# Patient Record
Sex: MALE | Race: BLACK OR AFRICAN AMERICAN | NOT HISPANIC OR LATINO | Employment: FULL TIME | URBAN - METROPOLITAN AREA
[De-identification: names, ages, dates, MRNs, and addresses within clinical notes are randomized per-mention and may not be internally consistent; named-entity substitution may affect disease eponyms.]

---

## 2017-01-10 ENCOUNTER — OFFICE VISIT (OUTPATIENT)
Dept: INTERNAL MEDICINE | Facility: CLINIC | Age: 27
End: 2017-01-10
Payer: COMMERCIAL

## 2017-01-10 ENCOUNTER — LAB VISIT (OUTPATIENT)
Dept: LAB | Facility: HOSPITAL | Age: 27
End: 2017-01-10
Attending: FAMILY MEDICINE
Payer: COMMERCIAL

## 2017-01-10 VITALS
HEIGHT: 70 IN | RESPIRATION RATE: 16 BRPM | OXYGEN SATURATION: 99 % | HEART RATE: 116 BPM | BODY MASS INDEX: 36.96 KG/M2 | SYSTOLIC BLOOD PRESSURE: 145 MMHG | DIASTOLIC BLOOD PRESSURE: 98 MMHG | WEIGHT: 258.19 LBS | TEMPERATURE: 98 F

## 2017-01-10 DIAGNOSIS — Z11.3 SCREEN FOR STD (SEXUALLY TRANSMITTED DISEASE): ICD-10-CM

## 2017-01-10 DIAGNOSIS — I10 ESSENTIAL HYPERTENSION: Primary | ICD-10-CM

## 2017-01-10 DIAGNOSIS — Z20.2 EXPOSURE TO TRICHOMONAS: ICD-10-CM

## 2017-01-10 PROCEDURE — 36415 COLL VENOUS BLD VENIPUNCTURE: CPT | Mod: PO

## 2017-01-10 PROCEDURE — 87591 N.GONORRHOEAE DNA AMP PROB: CPT

## 2017-01-10 PROCEDURE — 86592 SYPHILIS TEST NON-TREP QUAL: CPT

## 2017-01-10 PROCEDURE — 1159F MED LIST DOCD IN RCRD: CPT | Mod: S$GLB,,, | Performed by: FAMILY MEDICINE

## 2017-01-10 PROCEDURE — 99999 PR PBB SHADOW E&M-EST. PATIENT-LVL III: CPT | Mod: PBBFAC,,, | Performed by: FAMILY MEDICINE

## 2017-01-10 PROCEDURE — 86703 HIV-1/HIV-2 1 RESULT ANTBDY: CPT

## 2017-01-10 PROCEDURE — 99214 OFFICE O/P EST MOD 30 MIN: CPT | Mod: S$GLB,,, | Performed by: FAMILY MEDICINE

## 2017-01-10 PROCEDURE — 3080F DIAST BP >= 90 MM HG: CPT | Mod: S$GLB,,, | Performed by: FAMILY MEDICINE

## 2017-01-10 PROCEDURE — 3077F SYST BP >= 140 MM HG: CPT | Mod: S$GLB,,, | Performed by: FAMILY MEDICINE

## 2017-01-10 RX ORDER — METRONIDAZOLE 500 MG/1
2000 TABLET ORAL ONCE
Qty: 4 TABLET | Refills: 0 | Status: SHIPPED | OUTPATIENT
Start: 2017-01-10 | End: 2017-01-10

## 2017-01-10 NOTE — MR AVS SNAPSHOT
[facility] Internal Medicine  [address] [address] [address]  Phone: [phone]                  Yong Borrero Obear   1/10/2017 3:20 PM   Office Visit    Description:  Male : 1990   Provider:  Jimmy Saxena DO   Department:  [facility] Internal Medicine           Reason for Visit     Male  Problem           Diagnoses this Visit        Comments    Essential hypertension    -  Primary     Screen for STD (sexually transmitted disease)         Exposure to trichomonas                To Do List           Future Appointments        Provider Department Dept Phone    1/10/2017 3:50 PM [facility] LABORATORY [facility] [phone]    7/10/2017 1:40 PM Jimmy Saxena DO [facility] [phone]      Goals (5 Years of Data)     None      Follow-Up and Disposition     Return in about 6 months (around 7/10/2017), or if symptoms worsen or fail to improve.       These Medications        Disp Refills Start End    metronidazole (FLAGYL) 500 MG tablet 4 tablet 0 1/10/2017 1/10/2017    Take 4 tablets (2,000 mg total) by mouth once. - Oral    Pharmacy: [facility]/pharmacy #[id number] - [address] #: [phone]         [facility]     [facility] Nurse Care Line -  Assistance  Registered nurses in the [facility] provide clinical advisement, health education, appointment booking, and other advisory services.  Call for this free service at [phone].             Medications           Message regarding Medications     Verify the changes and/or additions to your medication regime listed below are the same as discussed with your clinician today.  If any of these changes or additions are incorrect, please notify your healthcare provider.        START taking these NEW medications        Refills    metronidazole (FLAGYL) 500 MG tablet 0    Sig: Take 4 tablets (2,000 mg total) by mouth once.    Class: Normal    Route: Oral     "       Verify that the below list of medications is an accurate representation of the medications you are currently taking.  If none reported, the list may be blank. If incorrect, please contact your healthcare provider. Carry this list with you in case of emergency.           Current Medications     amlodipine (NORVASC) 10 MG tablet Take 1 tablet (10 mg total) by mouth once daily.    aspirin-acetaminophen-caffeine 250-250-65 mg (EXCEDRIN MIGRAINE) 250-250-65 mg per tablet Take 1 tablet by mouth every 6 (six) hours as needed for Pain.    metronidazole (FLAGYL) 500 MG tablet Take 4 tablets (2,000 mg total) by mouth once.    ondansetron (ZOFRAN-ODT) 8 MG TbDL Take 1 tablet (8 mg total) by mouth every 12 (twelve) hours as needed.    promethazine (PHENERGAN) 25 MG tablet Take 25 mg by mouth every 4 (four) hours.           Clinical Reference Information           Vital Signs - Last Recorded  Most recent update: 1/10/2017  2:55 PM by Sally Quiñonez LPN    BP Pulse Temp Resp    (!) 145/98 (BP Location: Left arm, Patient Position: Sitting, BP Method: Automatic) (!) 116 98.3 °F (36.8 °C) (Tympanic) 16    Ht Wt SpO2 BMI    5' 10" (1.778 m) 117.1 kg (258 lb 2.5 oz) 99% 37.04 kg/m2      Blood Pressure          Most Recent Value    BP  (!)  145/98      Allergies as of 1/10/2017     No Known Allergies      Immunizations Administered on Date of Encounter - 1/10/2017     None      Orders Placed During Today's Visit     Future Labs/Procedures Expected by Expires    C. trachomatis/N. gonorrhoeae by AMP DNA Urine  1/10/2017 (Approximate) 2/9/2017    HIV-1 and HIV-2 antibodies  1/10/2017 3/11/2018    RPR  1/10/2017 3/11/2018      "

## 2017-01-10 NOTE — PROGRESS NOTES
Subjective:       Patient ID: Yong Black is a 26 y.o. male.    Chief Complaint: Male  Problem    HPI  The day with concerns about STD testing.  He recently received a form from his girlfriend.  She had a routine gynecological exam and was told that she had Trichomonas.  She did not have any symptoms and neither does he.  He is stressed about this.  He reports being faithful to his girlfriend and has no other symptoms or history of STD.  He has been taking his blood pressure medication and his numbers have been well controlled at home.  He feels that the elevation today is likely from stress in regards to the news that he received yesterday.    Family History   Problem Relation Age of Onset    Diabetes Mother     Hypertension Mother     Hypertension Father     Hypertension Maternal Aunt     Stroke Maternal Aunt     Hypertension Maternal Grandmother     Hypertension Maternal Grandfather     Stroke Maternal Grandfather     Hypertension Paternal Grandmother     Hypertension Paternal Grandfather     Alcohol abuse Neg Hx     Cancer Neg Hx     COPD Neg Hx     Asthma Neg Hx     Depression Neg Hx      Current Outpatient Prescriptions on File Prior to Visit   Medication Sig Dispense Refill    amlodipine (NORVASC) 10 MG tablet Take 1 tablet (10 mg total) by mouth once daily. 30 tablet 11    aspirin-acetaminophen-caffeine 250-250-65 mg (EXCEDRIN MIGRAINE) 250-250-65 mg per tablet Take 1 tablet by mouth every 6 (six) hours as needed for Pain.  0    ondansetron (ZOFRAN-ODT) 8 MG TbDL Take 1 tablet (8 mg total) by mouth every 12 (twelve) hours as needed. 30 tablet 9    promethazine (PHENERGAN) 25 MG tablet Take 25 mg by mouth every 4 (four) hours.       No current facility-administered medications on file prior to visit.        Review of Systems   Constitutional: Negative for chills, fever and unexpected weight change.   HENT: Negative for congestion, ear pain, sore throat and voice change.    Eyes:  "Negative for pain and visual disturbance.   Respiratory: Negative for cough, shortness of breath and wheezing.    Cardiovascular: Negative for chest pain.   Gastrointestinal: Negative for abdominal pain, nausea and vomiting.   Genitourinary: Negative for difficulty urinating.   Musculoskeletal: Negative for back pain.   Skin: Negative for rash.   Neurological: Negative for dizziness and speech difficulty.   Hematological: Does not bruise/bleed easily.   Psychiatric/Behavioral: Negative for sleep disturbance and suicidal ideas. The patient is not nervous/anxious.        Objective:     Visit Vitals    BP (!) 145/98 (BP Location: Left arm, Patient Position: Sitting, BP Method: Automatic)    Pulse (!) 116    Temp 98.3 °F (36.8 °C) (Tympanic)    Resp 16    Ht 5' 10" (1.778 m)    Wt 117.1 kg (258 lb 2.5 oz)    SpO2 99%    BMI 37.04 kg/m2        Physical Exam   Constitutional: He is oriented to person, place, and time. He appears well-developed and well-nourished. No distress.   HENT:   Head: Normocephalic and atraumatic.   Nose: Nose normal.   Eyes: Conjunctivae and EOM are normal. Pupils are equal, round, and reactive to light. Right eye exhibits no discharge. Left eye exhibits no discharge.   Neck: No thyromegaly present.   Cardiovascular: Normal rate, regular rhythm and normal heart sounds.    No murmur heard.  Pulmonary/Chest: Effort normal and breath sounds normal. No respiratory distress. He has no wheezes.   Abdominal: Soft. He exhibits no distension.   Musculoskeletal: He exhibits no edema.   Neurological: He is alert and oriented to person, place, and time.   Skin: Skin is warm. No rash noted. He is not diaphoretic.   Psychiatric: He has a normal mood and affect. His behavior is normal.   Vitals reviewed.      Assessment & Plan     1. Essential hypertension  A bit elevated today but he has been monitoring at home and compliant with medications.  Will not make any adjustments at this time however will ask " him to follow up so that we can ensure control.    2. Screen for STD (sexually transmitted disease)  He has no symptoms of STD, however will treat him with a course of metronidazole since he has a positive contact.  - C. trachomatis/N. gonorrhoeae by AMP DNA Urine; Future  - RPR; Future  - HIV-1 and HIV-2 antibodies; Future    3. Exposure to trichomonas  Treating as above.

## 2017-01-11 LAB
HIV 1+2 AB+HIV1 P24 AG SERPL QL IA: NEGATIVE
RPR SER QL: NORMAL

## 2017-01-11 NOTE — PROGRESS NOTES
Please inform patient that all of the labs are normal if he/she does not have [facility] activated. If there are any questions please let me know.

## 2017-01-12 LAB
C TRACH DNA SPEC QL NAA+PROBE: NEGATIVE
N GONORRHOEA DNA SPEC QL NAA+PROBE: NEGATIVE

## 2017-06-25 DIAGNOSIS — I10 ESSENTIAL HYPERTENSION: ICD-10-CM

## 2017-06-26 ENCOUNTER — PATIENT OUTREACH (OUTPATIENT)
Dept: ADMINISTRATIVE | Facility: HOSPITAL | Age: 27
End: 2017-06-26

## 2017-06-26 RX ORDER — AMLODIPINE BESYLATE 10 MG/1
10 TABLET ORAL DAILY
Qty: 30 TABLET | Refills: 7 | Status: SHIPPED | OUTPATIENT
Start: 2017-06-26 | End: 2017-07-21 | Stop reason: SDUPTHER

## 2017-07-21 ENCOUNTER — OFFICE VISIT (OUTPATIENT)
Dept: INTERNAL MEDICINE | Facility: CLINIC | Age: 27
End: 2017-07-21
Payer: COMMERCIAL

## 2017-07-21 ENCOUNTER — LAB VISIT (OUTPATIENT)
Dept: LAB | Facility: HOSPITAL | Age: 27
End: 2017-07-21
Attending: NURSE PRACTITIONER
Payer: COMMERCIAL

## 2017-07-21 ENCOUNTER — TELEPHONE (OUTPATIENT)
Dept: INTERNAL MEDICINE | Facility: CLINIC | Age: 27
End: 2017-07-21

## 2017-07-21 VITALS
RESPIRATION RATE: 16 BRPM | OXYGEN SATURATION: 99 % | SYSTOLIC BLOOD PRESSURE: 157 MMHG | HEART RATE: 80 BPM | HEIGHT: 70 IN | BODY MASS INDEX: 36.1 KG/M2 | TEMPERATURE: 98 F | DIASTOLIC BLOOD PRESSURE: 96 MMHG | WEIGHT: 252.19 LBS

## 2017-07-21 DIAGNOSIS — Z29.9 PREVENTIVE MEASURE: ICD-10-CM

## 2017-07-21 DIAGNOSIS — N52.2 DRUG-INDUCED ERECTILE DYSFUNCTION: ICD-10-CM

## 2017-07-21 DIAGNOSIS — I10 ESSENTIAL HYPERTENSION: Primary | ICD-10-CM

## 2017-07-21 DIAGNOSIS — I10 ESSENTIAL HYPERTENSION: ICD-10-CM

## 2017-07-21 DIAGNOSIS — R71.8 ELEVATED RED BLOOD CELL COUNT: Primary | ICD-10-CM

## 2017-07-21 LAB
ALBUMIN SERPL BCP-MCNC: 3.8 G/DL
ALP SERPL-CCNC: 99 U/L
ALT SERPL W/O P-5'-P-CCNC: 14 U/L
ANION GAP SERPL CALC-SCNC: 9 MMOL/L
AST SERPL-CCNC: 17 U/L
BASOPHILS # BLD AUTO: 0.03 K/UL
BASOPHILS NFR BLD: 0.4 %
BILIRUB SERPL-MCNC: 0.4 MG/DL
BUN SERPL-MCNC: 13 MG/DL
CALCIUM SERPL-MCNC: 9.1 MG/DL
CHLORIDE SERPL-SCNC: 108 MMOL/L
CHOLEST/HDLC SERPL: 3.7 {RATIO}
CO2 SERPL-SCNC: 22 MMOL/L
CREAT SERPL-MCNC: 1.2 MG/DL
DIFFERENTIAL METHOD: ABNORMAL
EOSINOPHIL # BLD AUTO: 0 K/UL
EOSINOPHIL NFR BLD: 0.6 %
ERYTHROCYTE [DISTWIDTH] IN BLOOD BY AUTOMATED COUNT: 17.5 %
EST. GFR  (AFRICAN AMERICAN): >60 ML/MIN/1.73 M^2
EST. GFR  (NON AFRICAN AMERICAN): >60 ML/MIN/1.73 M^2
GLUCOSE SERPL-MCNC: 101 MG/DL
HCT VFR BLD AUTO: 45.3 %
HDL/CHOLESTEROL RATIO: 27.3 %
HDLC SERPL-MCNC: 165 MG/DL
HDLC SERPL-MCNC: 45 MG/DL
HGB BLD-MCNC: 14.6 G/DL
LDLC SERPL CALC-MCNC: 105.8 MG/DL
LYMPHOCYTES # BLD AUTO: 1.7 K/UL
LYMPHOCYTES NFR BLD: 24.3 %
MCH RBC QN AUTO: 22.8 PG
MCHC RBC AUTO-ENTMCNC: 32.2 G/DL
MCV RBC AUTO: 71 FL
MONOCYTES # BLD AUTO: 0.9 K/UL
MONOCYTES NFR BLD: 12.7 %
NEUTROPHILS # BLD AUTO: 4.4 K/UL
NEUTROPHILS NFR BLD: 61.7 %
NONHDLC SERPL-MCNC: 120 MG/DL
PLATELET # BLD AUTO: 242 K/UL
PMV BLD AUTO: 10.4 FL
POTASSIUM SERPL-SCNC: 4 MMOL/L
PROT SERPL-MCNC: 8.1 G/DL
RBC # BLD AUTO: 6.4 M/UL
SODIUM SERPL-SCNC: 139 MMOL/L
TRIGL SERPL-MCNC: 71 MG/DL
TSH SERPL DL<=0.005 MIU/L-ACNC: 1.1 UIU/ML
WBC # BLD AUTO: 7.17 K/UL

## 2017-07-21 PROCEDURE — 84443 ASSAY THYROID STIM HORMONE: CPT | Mod: PO

## 2017-07-21 PROCEDURE — 99999 PR PBB SHADOW E&M-EST. PATIENT-LVL III: CPT | Mod: PBBFAC,,, | Performed by: NURSE PRACTITIONER

## 2017-07-21 PROCEDURE — 85025 COMPLETE CBC W/AUTO DIFF WBC: CPT | Mod: PO

## 2017-07-21 PROCEDURE — 80061 LIPID PANEL: CPT

## 2017-07-21 PROCEDURE — 80053 COMPREHEN METABOLIC PANEL: CPT | Mod: PO

## 2017-07-21 PROCEDURE — 99214 OFFICE O/P EST MOD 30 MIN: CPT | Mod: S$GLB,,, | Performed by: NURSE PRACTITIONER

## 2017-07-21 PROCEDURE — 36415 COLL VENOUS BLD VENIPUNCTURE: CPT | Mod: PO

## 2017-07-21 RX ORDER — AMLODIPINE BESYLATE 10 MG/1
10 TABLET ORAL DAILY
Qty: 30 TABLET | Refills: 7 | Status: SHIPPED | OUTPATIENT
Start: 2017-07-21 | End: 2018-07-21

## 2017-07-21 RX ORDER — TADALAFIL 10 MG/1
10 TABLET ORAL DAILY PRN
Qty: 5 TABLET | Refills: 0 | Status: SHIPPED | OUTPATIENT
Start: 2017-07-21 | End: 2018-07-21

## 2017-07-21 NOTE — TELEPHONE ENCOUNTER
----- Message from TOMAS Chapman,FNP-C sent at 7/21/2017  3:19 PM CDT -----  All labs within acceptable range except RBC high and the red blood cells are small and pale. This could be caused by any number of conditions. Recommend referral to hematology for further testing

## 2017-07-21 NOTE — PROGRESS NOTES
Subjective:       Patient ID: Yong Black is a 27 y.o. male.    Chief Complaint: Follow-up (htn)    Mr Black is also concerned that he is having issues with his sexual performance. Able to get and erection unable to maintain      Hypertension   This is a new problem. The current episode started more than 1 year ago. The problem has been gradually improving since onset. Condition status: unsure as pt took his medication immediatly prior to arrival. Pertinent negatives include no anxiety, blurred vision, chest pain, headaches, malaise/fatigue, neck pain, orthopnea, palpitations, peripheral edema, PND, shortness of breath or sweats. There are no associated agents to hypertension. Risk factors for coronary artery disease include obesity. Past treatments include calcium channel blockers. Improvement on treatment: unknown. Compliance problems include diet.  There is no history of angina, kidney disease or heart failure.     Review of Systems   Constitutional: Negative.  Negative for malaise/fatigue.   HENT: Negative.    Eyes: Negative.  Negative for blurred vision.   Respiratory: Negative.  Negative for shortness of breath.    Cardiovascular: Negative.  Negative for chest pain, palpitations, orthopnea and PND.   Gastrointestinal: Negative.    Endocrine: Negative.    Genitourinary: Negative for decreased urine volume, discharge, dysuria, penile pain, penile swelling, scrotal swelling and testicular pain.        Erectile difficulty   Musculoskeletal: Negative.  Negative for neck pain.   Allergic/Immunologic: Negative.    Neurological: Negative.  Negative for headaches.   Hematological: Negative.    Psychiatric/Behavioral: Negative.        Objective:      Physical Exam   Constitutional: He is oriented to person, place, and time. He appears well-developed. No distress.   obese   HENT:   Head: Normocephalic and atraumatic.   Right Ear: Hearing, tympanic membrane, external ear and ear canal normal.   Left Ear: Hearing,  tympanic membrane, external ear and ear canal normal.   Nose: Nose normal. No mucosal edema, rhinorrhea, sinus tenderness or nasal deformity.   Mouth/Throat: Oropharynx is clear and moist. No oral lesions. Normal dentition. No oropharyngeal exudate, posterior oropharyngeal edema or posterior oropharyngeal erythema.   Eyes: Conjunctivae, EOM and lids are normal. Pupils are equal, round, and reactive to light.   Neck: Normal range of motion and full passive range of motion without pain. Neck supple. No JVD present. No tracheal deviation present. No thyromegaly present.   Cardiovascular: Normal rate, regular rhythm, S1 normal, S2 normal, normal heart sounds and intact distal pulses.    No murmur heard.  Pulmonary/Chest: Effort normal and breath sounds normal. No stridor. No respiratory distress.   Abdominal: Soft. Normal appearance and bowel sounds are normal. He exhibits no distension. There is no tenderness.   Musculoskeletal: Normal range of motion. He exhibits no edema or tenderness.   Lymphadenopathy:     He has no cervical adenopathy.   Neurological: He is alert and oriented to person, place, and time. He has normal reflexes.   Skin: Skin is warm, dry and intact. No rash noted. He is not diaphoretic. No cyanosis. Nails show no clubbing.   Psychiatric: He has a normal mood and affect. His speech is normal and behavior is normal. Judgment and thought content normal. Cognition and memory are normal.   Nursing note and vitals reviewed.      Assessment:       1. Essential hypertension    2. Drug-induced erectile dysfunction    3. Preventive measure        Plan:       *Essential hypertension  Pt will have occupational nurse monitor BP at his job and notify of results. If not good will add lisinopril. Hesitant to use HCTZ since already with ED.  -     amlodipine (NORVASC) 10 MG tablet; Take 1 tablet (10 mg total) by mouth once daily.  Dispense: 30 tablet; Refill: 7  -     Lipid panel; Future; Expected date:  07/21/2017  -     Comprehensive metabolic panel; Future; Expected date: 07/21/2017  -     CBC auto differential; Future; Expected date: 07/21/2017  -     TSH; Future; Expected date: 07/21/2017    Drug-induced erectile dysfunction  -     tadalafil (CIALIS) 10 MG tablet; Take 1 tablet (10 mg total) by mouth daily as needed for Erectile Dysfunction.  Dispense: 5 tablet; Refill: 0    Preventive measure  -     Lipid panel; Future; Expected date: 07/21/2017  -     Comprehensive metabolic panel; Future; Expected date: 07/21/2017  -     CBC auto differential; Future; Expected date: 07/21/2017  -     TSH; Future; Expected date: 07/21/2017    F/u per labs, likely 6 months if repeat BP is good, sooner if need to change BP med  **

## 2017-07-25 ENCOUNTER — TELEPHONE (OUTPATIENT)
Dept: INTERNAL MEDICINE | Facility: CLINIC | Age: 27
End: 2017-07-25

## 2017-07-25 NOTE — TELEPHONE ENCOUNTER
----- Message from China White sent at 7/25/2017  9:51 AM CDT -----  Contact: pt  Pt returning nurse call, please call pt @ [phone].

## 2017-07-25 NOTE — TELEPHONE ENCOUNTER
----- Message from Jani Blackwell sent at 7/25/2017  8:24 AM CDT -----  Contact: [phone]  Pt returning nurse call. Please call back at [phone]. thx

## 2018-05-03 ENCOUNTER — PATIENT OUTREACH (OUTPATIENT)
Dept: ADMINISTRATIVE | Facility: HOSPITAL | Age: 28
End: 2018-05-03

## 2018-05-03 NOTE — PROGRESS NOTES
I have attempted without success to contact this patient to schedule an appointment.Contact on file is no longer in service.

## 2018-09-13 ENCOUNTER — TELEPHONE (OUTPATIENT)
Dept: FAMILY MEDICINE | Facility: CLINIC | Age: 28
End: 2018-09-13

## 2018-09-19 ENCOUNTER — PATIENT OUTREACH (OUTPATIENT)
Dept: ADMINISTRATIVE | Facility: HOSPITAL | Age: 28
End: 2018-09-19

## 2019-02-20 ENCOUNTER — PATIENT OUTREACH (OUTPATIENT)
Dept: ADMINISTRATIVE | Facility: HOSPITAL | Age: 29
End: 2019-02-20

## 2021-09-17 ENCOUNTER — OFFICE VISIT (OUTPATIENT)
Dept: OPHTHALMOLOGY | Facility: CLINIC | Age: 31
End: 2021-09-17

## 2021-09-17 ENCOUNTER — HOSPITAL ENCOUNTER (EMERGENCY)
Facility: HOSPITAL | Age: 31
Discharge: HOME | End: 2021-09-17
Attending: EMERGENCY MEDICINE

## 2021-09-17 VITALS
BODY MASS INDEX: 33.88 KG/M2 | WEIGHT: 236.13 LBS | DIASTOLIC BLOOD PRESSURE: 111 MMHG | SYSTOLIC BLOOD PRESSURE: 170 MMHG | TEMPERATURE: 98 F | RESPIRATION RATE: 16 BRPM | HEART RATE: 77 BPM | OXYGEN SATURATION: 98 %

## 2021-09-17 DIAGNOSIS — S05.01XA ABRASION OF RIGHT CORNEA, INITIAL ENCOUNTER: Primary | ICD-10-CM

## 2021-09-17 DIAGNOSIS — H10.9 CONJUNCTIVITIS OF RIGHT EYE, UNSPECIFIED CONJUNCTIVITIS TYPE: ICD-10-CM

## 2021-09-17 PROCEDURE — 99999 PR PBB SHADOW E&M-EST. PATIENT-LVL II: CPT | Mod: PBBFAC,,, | Performed by: STUDENT IN AN ORGANIZED HEALTH CARE EDUCATION/TRAINING PROGRAM

## 2021-09-17 PROCEDURE — 99283 EMERGENCY DEPT VISIT LOW MDM: CPT | Mod: 27,ER

## 2021-09-17 PROCEDURE — 92002 PR EYE EXAM, NEW PATIENT,INTERMED: ICD-10-PCS | Mod: S$PBB,,, | Performed by: STUDENT IN AN ORGANIZED HEALTH CARE EDUCATION/TRAINING PROGRAM

## 2021-09-17 PROCEDURE — 99212 OFFICE O/P EST SF 10 MIN: CPT | Mod: PBBFAC | Performed by: STUDENT IN AN ORGANIZED HEALTH CARE EDUCATION/TRAINING PROGRAM

## 2021-09-17 PROCEDURE — 25000003 PHARM REV CODE 250: Mod: ER | Performed by: EMERGENCY MEDICINE

## 2021-09-17 PROCEDURE — 99999 PR PBB SHADOW E&M-EST. PATIENT-LVL II: ICD-10-PCS | Mod: PBBFAC,,, | Performed by: STUDENT IN AN ORGANIZED HEALTH CARE EDUCATION/TRAINING PROGRAM

## 2021-09-17 PROCEDURE — 92002 INTRM OPH EXAM NEW PATIENT: CPT | Mod: S$PBB,,, | Performed by: STUDENT IN AN ORGANIZED HEALTH CARE EDUCATION/TRAINING PROGRAM

## 2021-09-17 RX ORDER — CIPROFLOXACIN HYDROCHLORIDE 3 MG/ML
1 SOLUTION/ DROPS OPHTHALMIC EVERY 4 HOURS
Qty: 5 ML | Refills: 0 | Status: SHIPPED | OUTPATIENT
Start: 2021-09-17

## 2021-09-17 RX ORDER — ERYTHROMYCIN 5 MG/G
OINTMENT OPHTHALMIC
Status: COMPLETED | OUTPATIENT
Start: 2021-09-17 | End: 2021-09-17

## 2021-09-17 RX ORDER — ERYTHROMYCIN 5 MG/G
OINTMENT OPHTHALMIC
Qty: 1 TUBE | Refills: 0 | Status: SHIPPED | OUTPATIENT
Start: 2021-09-17

## 2021-09-17 RX ADMIN — ERYTHROMYCIN 1 INCH: 5 OINTMENT OPHTHALMIC at 11:09

## 2021-09-17 RX ADMIN — FLUORESCEIN SODIUM AND BENOXINATE HYDROCHLORIDE 1 DROP: 4; 2.5 SOLUTION OPHTHALMIC at 11:09

## 2021-09-20 ENCOUNTER — OFFICE VISIT (OUTPATIENT)
Dept: OPHTHALMOLOGY | Facility: CLINIC | Age: 31
End: 2021-09-20

## 2021-09-20 DIAGNOSIS — S05.01XD ABRASION OF RIGHT CORNEA, SUBSEQUENT ENCOUNTER: Primary | ICD-10-CM

## 2021-09-20 PROCEDURE — 92012 INTRM OPH EXAM EST PATIENT: CPT | Mod: S$PBB,,, | Performed by: STUDENT IN AN ORGANIZED HEALTH CARE EDUCATION/TRAINING PROGRAM

## 2021-09-20 PROCEDURE — 92012 PR EYE EXAM, EST PATIENT,INTERMED: ICD-10-PCS | Mod: S$PBB,,, | Performed by: STUDENT IN AN ORGANIZED HEALTH CARE EDUCATION/TRAINING PROGRAM

## 2021-09-20 PROCEDURE — 99999 PR PBB SHADOW E&M-EST. PATIENT-LVL I: ICD-10-PCS | Mod: PBBFAC,,, | Performed by: STUDENT IN AN ORGANIZED HEALTH CARE EDUCATION/TRAINING PROGRAM

## 2021-09-20 PROCEDURE — 99999 PR PBB SHADOW E&M-EST. PATIENT-LVL I: CPT | Mod: PBBFAC,,, | Performed by: STUDENT IN AN ORGANIZED HEALTH CARE EDUCATION/TRAINING PROGRAM

## 2021-09-20 PROCEDURE — 99211 OFF/OP EST MAY X REQ PHY/QHP: CPT | Mod: PBBFAC,PO | Performed by: STUDENT IN AN ORGANIZED HEALTH CARE EDUCATION/TRAINING PROGRAM

## 2023-07-24 NOTE — TELEPHONE ENCOUNTER
I have attempted without success to contact this patient to schedule an appointment.Contact on file is no longer in service  
chest pain x 2 days s/p cocaine, and heroin use, falling asleep during triage

## 2023-11-29 ENCOUNTER — HOSPITAL ENCOUNTER (EMERGENCY)
Facility: HOSPITAL | Age: 33
Discharge: HOME | End: 2023-11-29
Attending: EMERGENCY MEDICINE

## 2023-11-29 VITALS
DIASTOLIC BLOOD PRESSURE: 88 MMHG | OXYGEN SATURATION: 100 % | SYSTOLIC BLOOD PRESSURE: 188 MMHG | BODY MASS INDEX: 34.24 KG/M2 | RESPIRATION RATE: 20 BRPM | HEART RATE: 86 BPM | TEMPERATURE: 98 F | WEIGHT: 239.19 LBS | HEIGHT: 70 IN

## 2023-11-29 DIAGNOSIS — M79.676 TOE PAIN: ICD-10-CM

## 2023-11-29 DIAGNOSIS — I10 PRIMARY HYPERTENSION: ICD-10-CM

## 2023-11-29 DIAGNOSIS — M10.9 ACUTE GOUT OF RIGHT FOOT, UNSPECIFIED CAUSE: Primary | ICD-10-CM

## 2023-11-29 DIAGNOSIS — L03.115 CELLULITIS OF RIGHT LOWER EXTREMITY: ICD-10-CM

## 2023-11-29 LAB
ALBUMIN SERPL BCP-MCNC: 3.5 G/DL (ref 3.5–5.2)
ALP SERPL-CCNC: 91 U/L (ref 55–135)
ALT SERPL W/O P-5'-P-CCNC: 8 U/L (ref 10–44)
ANION GAP SERPL CALC-SCNC: 8 MMOL/L (ref 8–16)
AST SERPL-CCNC: 12 U/L (ref 10–40)
BASOPHILS # BLD AUTO: 0.03 K/UL (ref 0–0.2)
BASOPHILS NFR BLD: 0.3 % (ref 0–1.9)
BILIRUB SERPL-MCNC: 0.4 MG/DL (ref 0.1–1)
BUN SERPL-MCNC: 12 MG/DL (ref 6–20)
CALCIUM SERPL-MCNC: 9.1 MG/DL (ref 8.7–10.5)
CHLORIDE SERPL-SCNC: 105 MMOL/L (ref 95–110)
CO2 SERPL-SCNC: 25 MMOL/L (ref 23–29)
CREAT SERPL-MCNC: 1.3 MG/DL (ref 0.5–1.4)
DIFFERENTIAL METHOD: ABNORMAL
EOSINOPHIL # BLD AUTO: 0.1 K/UL (ref 0–0.5)
EOSINOPHIL NFR BLD: 0.7 % (ref 0–8)
ERYTHROCYTE [DISTWIDTH] IN BLOOD BY AUTOMATED COUNT: 14.7 % (ref 11.5–14.5)
EST. GFR  (NO RACE VARIABLE): >60 ML/MIN/1.73 M^2
GLUCOSE SERPL-MCNC: 96 MG/DL (ref 70–110)
HCT VFR BLD AUTO: 45.6 % (ref 40–54)
HGB BLD-MCNC: 14.3 G/DL (ref 14–18)
IMM GRANULOCYTES # BLD AUTO: 0.03 K/UL (ref 0–0.04)
IMM GRANULOCYTES NFR BLD AUTO: 0.3 % (ref 0–0.5)
LYMPHOCYTES # BLD AUTO: 1.7 K/UL (ref 1–4.8)
LYMPHOCYTES NFR BLD: 18.6 % (ref 18–48)
MCH RBC QN AUTO: 23.1 PG (ref 27–31)
MCHC RBC AUTO-ENTMCNC: 31.4 G/DL (ref 32–36)
MCV RBC AUTO: 74 FL (ref 82–98)
MONOCYTES # BLD AUTO: 1 K/UL (ref 0.3–1)
MONOCYTES NFR BLD: 11.5 % (ref 4–15)
NEUTROPHILS # BLD AUTO: 6.2 K/UL (ref 1.8–7.7)
NEUTROPHILS NFR BLD: 68.6 % (ref 38–73)
NRBC BLD-RTO: 0 /100 WBC
PLATELET # BLD AUTO: 231 K/UL (ref 150–450)
PMV BLD AUTO: 9.9 FL (ref 9.2–12.9)
POTASSIUM SERPL-SCNC: 4.1 MMOL/L (ref 3.5–5.1)
PROT SERPL-MCNC: 7.7 G/DL (ref 6–8.4)
RBC # BLD AUTO: 6.18 M/UL (ref 4.6–6.2)
SODIUM SERPL-SCNC: 138 MMOL/L (ref 136–145)
URATE SERPL-MCNC: 8.1 MG/DL (ref 3.4–7)
WBC # BLD AUTO: 9.08 K/UL (ref 3.9–12.7)

## 2023-11-29 PROCEDURE — 80053 COMPREHEN METABOLIC PANEL: CPT | Mod: ER | Performed by: EMERGENCY MEDICINE

## 2023-11-29 PROCEDURE — 99284 EMERGENCY DEPT VISIT MOD MDM: CPT | Mod: ER

## 2023-11-29 PROCEDURE — 94761 N-INVAS EAR/PLS OXIMETRY MLT: CPT | Mod: ER

## 2023-11-29 PROCEDURE — 25000003 PHARM REV CODE 250: Mod: ER | Performed by: EMERGENCY MEDICINE

## 2023-11-29 PROCEDURE — 85025 COMPLETE CBC W/AUTO DIFF WBC: CPT | Mod: ER | Performed by: EMERGENCY MEDICINE

## 2023-11-29 PROCEDURE — 84550 ASSAY OF BLOOD/URIC ACID: CPT | Mod: ER | Performed by: EMERGENCY MEDICINE

## 2023-11-29 RX ORDER — AMLODIPINE BESYLATE 5 MG/1
5 TABLET ORAL DAILY
Qty: 30 TABLET | Refills: 0 | Status: SHIPPED | OUTPATIENT
Start: 2023-11-29

## 2023-11-29 RX ORDER — KETOROLAC TROMETHAMINE 30 MG/ML
15 INJECTION, SOLUTION INTRAMUSCULAR; INTRAVENOUS
Status: DISCONTINUED | OUTPATIENT
Start: 2023-11-29 | End: 2023-11-29

## 2023-11-29 RX ORDER — AMLODIPINE BESYLATE 5 MG/1
5 TABLET ORAL
Status: COMPLETED | OUTPATIENT
Start: 2023-11-29 | End: 2023-11-29

## 2023-11-29 RX ORDER — CEPHALEXIN 500 MG/1
500 CAPSULE ORAL 4 TIMES DAILY
Qty: 20 CAPSULE | Refills: 0 | Status: SHIPPED | OUTPATIENT
Start: 2023-11-29 | End: 2023-12-04

## 2023-11-29 RX ORDER — INDOMETHACIN 25 MG/1
25 CAPSULE ORAL 3 TIMES DAILY PRN
Qty: 15 CAPSULE | Refills: 0 | Status: SHIPPED | OUTPATIENT
Start: 2023-11-29

## 2023-11-29 RX ORDER — KETOROLAC TROMETHAMINE 10 MG/1
10 TABLET, FILM COATED ORAL
Status: COMPLETED | OUTPATIENT
Start: 2023-11-29 | End: 2023-11-29

## 2023-11-29 RX ORDER — CEPHALEXIN 500 MG/1
500 CAPSULE ORAL
Status: COMPLETED | OUTPATIENT
Start: 2023-11-29 | End: 2023-11-29

## 2023-11-29 RX ADMIN — KETOROLAC TROMETHAMINE 10 MG: 10 TABLET, FILM COATED ORAL at 11:11

## 2023-11-29 RX ADMIN — CEPHALEXIN 500 MG: 500 CAPSULE ORAL at 11:11

## 2023-11-29 RX ADMIN — AMLODIPINE BESYLATE 5 MG: 5 TABLET ORAL at 10:11

## 2023-11-29 NOTE — ED PROVIDER NOTES
History     Chief Complaint   Patient presents with    Toe Pain     Right great toe swelling flaired up on fri.     HPI:  Yong Black is a 33 y.o. male with PMH of hypertension  who presents to the [facility] emergency department for evaluation of right great toe pain for 5 days but started after he drank a beer for Thanksgiving.  Symptoms improved with Naprosyn,tart Cherry juice, and celery.  He states this happened about 2 years ago but he was never evaluated.  He states that he has been out of his amlodipine for 5 days.  Patient drove himself to the ER.      PCP: No, Primary Doctor    Review of patient's allergies indicates:  No Known Allergies   Past Medical History:   Diagnosis Date    Hypertension     Migraine      No past surgical history on file.    Family History   Problem Relation Age of Onset    Diabetes Mother     Hypertension Mother     Hypertension Father     Hypertension Maternal Aunt     Stroke Maternal Aunt     Hypertension Maternal Grandmother     Hypertension Maternal Grandfather     Stroke Maternal Grandfather     Hypertension Paternal Grandmother     Hypertension Paternal Grandfather     Alcohol abuse Neg Hx     Cancer Neg Hx     COPD Neg Hx     Asthma Neg Hx     Depression Neg Hx      Social History     Tobacco Use    Smoking status: Former     Current packs/day: 0.05     Average packs/day: 0.1 packs/day for 4.0 years (0.2 ttl pk-yrs)     Types: Cigarettes    Smokeless tobacco: Never   Substance and Sexual Activity    Alcohol use: No    Drug use: No    Sexual activity: Yes     Partners: Female      Review of Systems     Review of Systems   Constitutional:  Negative for fever.   Respiratory:  Negative for shortness of breath.    Cardiovascular:  Negative for chest pain.   Gastrointestinal:  Negative for diarrhea, nausea and vomiting.   Genitourinary:  Negative for decreased urine volume.   Neurological:  Negative for headaches.        Physical Exam     Initial Vitals   BP  "Pulse Resp Temp SpO2   11/29/23 0912 11/29/23 0911 11/29/23 0911 11/29/23 0911 11/29/23 0911   (!) 221/128 77 20 98.1 °F (36.7 °C) 99 %      MAP       --                 Vital signs reviewed.  Constitutional: Patient is in no acute distress. Well-developed and well-nourished.  Head: Atraumatic. Normocephalic.  Eyes: PERRL. EOM intact. Conjunctivae are not pale. No scleral icterus.  ENT: Mucous membranes are moist. Oropharynx is clear and symmetric.    Neck: Supple. Full ROM. No lymphadenopathy.  Cardiovascular: Regular rate. Regular rhythm. No murmurs, rubs, or gallops. DP pulses are 2+ and symmetric.  Pulmonary/Chest: No respiratory distress. Clear to auscultation bilaterally. No wheezing or rales.  Abdominal: Soft and non-distended.  No tenderness to palpation.  No rebound, guarding, or rigidity. Good bowel sounds.  Genitourinary: No CVA tenderness.   Musculoskeletal: Moves all extremities. No obvious deformities. No edema. No calf tenderness.  Erythema, warmth, and edema to the dorsal surface of the right foot.  Skin: Warm and dry.  Neurological:  Alert, awake, and appropriate.  Normal speech.  No acute focal neurological deficits are appreciated.  Psychiatric: Normal affect. Good eye contact. Appropriate in content.     ED Course   Procedures  Vitals:    11/29/23 0911 11/29/23 0912 11/29/23 0918 11/29/23 0935   BP:  (!) 221/128 (!) 171/99 (!) 178/103   Pulse: 77      Resp: 20      Temp: 98.1 °F (36.7 °C)      TempSrc: Oral      SpO2: 99%      Weight: 108.5 kg (239 lb 3.2 oz)      Height: 5' 10" (1.778 m)       11/29/23 0936 11/29/23 1002 11/29/23 1008 11/29/23 1029   BP: (!) 192/96 (!) 184/89  (!) 169/95   Pulse:   77    Resp:   20    Temp:       TempSrc:       SpO2:   99%    Weight:       Height:        11/29/23 1031 11/29/23 1129   BP:  (!) 188/88   Pulse: 81 86   Resp:  20   Temp:     TempSrc:     SpO2:  100%   Weight:     Height:       Lab Results Interpreted as Abnormal:  Labs Reviewed   CBC W/ AUTO " DIFFERENTIAL - Abnormal; Notable for the following components:       Result Value    MCV 74 (*)     MCH 23.1 (*)     MCHC 31.4 (*)     RDW 14.7 (*)     All other components within normal limits   COMPREHENSIVE METABOLIC PANEL - Abnormal; Notable for the following components:    ALT 8 (*)     All other components within normal limits   URIC ACID - Abnormal; Notable for the following components:    Uric Acid 8.1 (*)     All other components within normal limits      All Lab Results:  Results for orders placed or performed during the hospital encounter of 11/29/23   CBC auto differential   Result Value Ref Range    WBC 9.08 3.90 - 12.70 K/uL    RBC 6.18 4.60 - 6.20 M/uL    Hemoglobin 14.3 14.0 - 18.0 g/dL    Hematocrit 45.6 40.0 - 54.0 %    MCV 74 (L) 82 - 98 fL    MCH 23.1 (L) 27.0 - 31.0 pg    MCHC 31.4 (L) 32.0 - 36.0 g/dL    RDW 14.7 (H) 11.5 - 14.5 %    Platelets 231 150 - 450 K/uL    MPV 9.9 9.2 - 12.9 fL    Immature Granulocytes 0.3 0.0 - 0.5 %    Gran # (ANC) 6.2 1.8 - 7.7 K/uL    Immature Grans (Abs) 0.03 0.00 - 0.04 K/uL    Lymph # 1.7 1.0 - 4.8 K/uL    Mono # 1.0 0.3 - 1.0 K/uL    Eos # 0.1 0.0 - 0.5 K/uL    Baso # 0.03 0.00 - 0.20 K/uL    nRBC 0 0 /100 WBC    Gran % 68.6 38.0 - 73.0 %    Lymph % 18.6 18.0 - 48.0 %    Mono % 11.5 4.0 - 15.0 %    Eosinophil % 0.7 0.0 - 8.0 %    Basophil % 0.3 0.0 - 1.9 %    Differential Method Automated    Comprehensive metabolic panel   Result Value Ref Range    Sodium 138 136 - 145 mmol/L    Potassium 4.1 3.5 - 5.1 mmol/L    Chloride 105 95 - 110 mmol/L    CO2 25 23 - 29 mmol/L    Glucose 96 70 - 110 mg/dL    BUN 12 6 - 20 mg/dL    Creatinine 1.3 0.5 - 1.4 mg/dL    Calcium 9.1 8.7 - 10.5 mg/dL    Total Protein 7.7 6.0 - 8.4 g/dL    Albumin 3.5 3.5 - 5.2 g/dL    Total Bilirubin 0.4 0.1 - 1.0 mg/dL    Alkaline Phosphatase 91 55 - 135 U/L    AST 12 10 - 40 U/L    ALT 8 (L) 10 - 44 U/L    eGFR >60.0 >60 mL/min/1.73 m^2    Anion Gap 8 8 - 16 mmol/L   Uric acid   Result Value Ref  Range    Uric Acid 8.1 (H) 3.4 - 7.0 mg/dL     Imaging Results              X-Ray Foot Complete Right (Final result)  Result time 11/29/23 10:35:17      Final result by Daniel Mack MD (11/29/23 10:35:17)                   Impression:      No acute osseous findings.      Electronically signed by: Daniel Mack MD  Date:    11/29/2023  Time:    10:35               Narrative:    EXAMINATION:  XR FOOT COMPLETE 3 VIEW RIGHT    CLINICAL HISTORY:  Pain in unspecified toe(s)    TECHNIQUE:  Three views right foot.    COMPARISON:  None    FINDINGS:  Negative for acute fracture or dislocation.    No significant arthritic changes.    No suspicious focal bony abnormality.    Diffuse mild soft tissue edema.                                     ED Physician's independent review of the above imaging: agree with radiologist, normal x-ray of right foot.    The EKG was ordered, reviewed, and independently interpreted by the ED Physician:  Rhythm: normal sinus rhythm  Rate:  73 bpm  No ST-T changes concerning for acute ischemia.  OH interval 132.  .  .  ?LVH.  Normal axis.    The emergency physician reviewed the vital signs and test results, which are outlined above.     ED Discussion       ED Course as of 11/29/23 1645   Wed Nov 29, 2023   1058 Uric Acid(!): 8.1  Likely gout [NF]   1058 BUN: 12 [NF]   1058 Creatinine: 1.3 [NF]   1058 WBC: 9.08 [NF]   1058 RBC: 6.18 [NF]   1058 Hemoglobin: 14.3 [NF]   1058 33-year-old male who presents with gout like symptoms.  He was also found to be hypertensive, asymptomatic which is likely due to a combination of pain and being out of his amlodipine.  Uric acid is elevated and there is no fever, leukocytosis, or joint effusion to indicate septic joint.  He is no calf tenderness to indicate DVT.  Possible associated cellulitis therefore we will cover with pain medication (colchicine and indomethacin not available at this facility in allopurinol contraindicated an acute flare/patient  drove himself therefore narcotics are contraindicated) and antibiotics.  Refill for amlodipine also provided. [NF]   1103 Patient has nothing to indicate hypertensive emergency on EKG or CMP. [NF]      ED Course User Index  [NF] Letty Tejeda, DO             ED Medication(s) Administered:  Medications   amLODIPine tablet 5 mg (5 mg Oral Given 11/29/23 1029)   cephALEXin capsule 500 mg (500 mg Oral Given 11/29/23 1131)   ketorolac tablet 10 mg (10 mg Oral Given 11/29/23 1134)       Prescription Management: I performed a review of the patient's current Rx medication list as input by nursing staff.    Discharge Medication List as of 11/29/2023 11:23 AM        START taking these medications    Details   cephALEXin (KEFLEX) 500 MG capsule Take 1 capsule (500 mg total) by mouth 4 (four) times daily. for 5 days, Starting Wed 11/29/2023, Until Mon 12/4/2023, Print      indomethacin (INDOCIN) 25 MG capsule Take 1 capsule (25 mg total) by mouth 3 (three) times daily as needed (pain). Take  with meals, Starting Wed 11/29/2023, Print           CONTINUE these medications which have CHANGED    Details   amLODIPine (NORVASC) 5 MG tablet Take 1 tablet (5 mg total) by mouth once daily., Starting Wed 11/29/2023, Print           CONTINUE these medications which have NOT CHANGED    Details   aspirin-acetaminophen-caffeine 250-250-65 mg (EXCEDRIN MIGRAINE) 250-250-65 mg per tablet Take 1 tablet by mouth every 6 (six) hours as needed for Pain., Starting 6/22/2016, Until Discontinued, OTC      ciprofloxacin HCl (CILOXAN) 0.3 % ophthalmic solution Place 1 drop into the right eye every 4 (four) hours., Starting Fri 9/17/2021, Normal      erythromycin (ROMYCIN) ophthalmic ointment Place a 1/2 inch ribbon of ointment into the right lower eyelid 4 times per day for 5 days., Normal      tadalafil (CIALIS) 10 MG tablet Take 1 tablet (10 mg total) by mouth daily as needed for Erectile Dysfunction., Starting Fri 7/21/2017, Until Sat 7/21/2018,  Normal               Follow-up Information       [facility] Emergency Dept.    Specialty: Emergency Medicine  Why: As needed, If symptoms worsen  Contact information:  [address]  [place] [id number] [phone]             [place] - In 3 days.    Contact information:  [address]  [place] [address]  [phone]               [facility] Emergency Dept.    Specialty: Emergency Medicine  Contact information:  [address]  [place] [id number] [phone]                          Clinical Impression       ICD-10-CM ICD-9-CM   1. Acute gout of right foot, unspecified cause  M10.9 274.01   2. Toe pain  M79.676 729.5   3. Cellulitis of right lower extremity  L03.115 682.6   4. Primary hypertension  I10 401.9      ED Disposition Condition    Discharge Stable               Letty Tejeda,   11/29/23 3153

## 2023-11-29 NOTE — Clinical Note
"Yong Pleitez"Wayne was seen and treated in our emergency department on 11/29/2023.  He may return to work on 11/30/2023.       If you have any questions or concerns, please don't hesitate to call.       RN    "